# Patient Record
Sex: MALE | Race: WHITE
[De-identification: names, ages, dates, MRNs, and addresses within clinical notes are randomized per-mention and may not be internally consistent; named-entity substitution may affect disease eponyms.]

---

## 2022-06-30 ENCOUNTER — HOSPITAL ENCOUNTER (OUTPATIENT)
Dept: HOSPITAL 47 - RADCTMAIN | Age: 52
Discharge: HOME | End: 2022-06-30
Attending: FAMILY MEDICINE
Payer: COMMERCIAL

## 2022-06-30 DIAGNOSIS — R91.8: ICD-10-CM

## 2022-06-30 DIAGNOSIS — Z12.2: Primary | ICD-10-CM

## 2022-06-30 DIAGNOSIS — Z87.891: ICD-10-CM

## 2022-06-30 PROCEDURE — 71271 CT THORAX LUNG CANCER SCR C-: CPT

## 2022-07-01 NOTE — CTL
EXAMINATION TYPE:  CT Low Dose Lung

 

DATE OF EXAM ORDERED: 6/30/2022

 

HISTORY: Personal history of nicotine dependence. Lung cancer screening

 

CT DLP: 96 mGycm

CT CTDI: 2.30 mGy

Automated exposure control for dose reduction was used.

 

SCREENING VISIT: Baseline

 

COMPARISON: None available

 

TECHNIQUE: Low dose computed tomography scan was performed through the chest at 1 mm thick sections a
nd reconstructed images in multiple planes at 1 mm and 5 mm thick sections.

 

CT DIAGNOSTIC QUALITY: Satisfactory

 

FINDINGS:

LUNG NODULES: 

 

Right lung solid  5 mm nodule on CT image 227.  

 

Right lung base anterior solid  6 mm nodule on CT image 283.  

 

Right lung base 7 mm solid nodule in CT image 271.

 

Right lung solid elongated 10 mm parasagittal perifissural nodule in CT image 224.

 

Left lower lobe solid pleural-based 6 mm nodule in CT image 233.

 

Left lower lobe solid pleural-based 4 mm nodule in CT image 240.

 

LUNGS:

COPD: Severity: Moderate to markedly

Fibrosis: Severity: Minimal

Lymph nodes: No pathologically enlarged lymph nodes.

Other findings: None

 

RIGHT PLEURAL SPACE:

Effusion: None

Calcification: None

Thickening: None

Pneumothorax: None

 

LEFT PLEURAL SPACE:

Effusion: None

Calcification: None

Thickening: None

Pneumothorax: None

 

HEART:  

Heart Size: Normal

Coronary Calcification: Severe

Pericardial Effusion: None

 

OTHER FINDINGS:

Upper abdomen: 11 mm left hepatic lobe hypodensity, likely representing a hepatic cyst.

Bony thorax: No gross aggressive bone lesion.

Supraclavicular region: None

Other: None

 

IMPRESSION: Scattered pulmonary nodules measuring up to 7 mm as detailed above. COPD changes. Other i
ncidental findings as described above.

 

CT LUNG RAD AND CT CHEST RECOMMENDATION: Lung-Rad 3 Probably Benign: 6 month follow-up LDCT.

 

S Modifier (other clinically significant findings): As above.

## 2022-08-17 ENCOUNTER — HOSPITAL ENCOUNTER (OUTPATIENT)
Dept: HOSPITAL 47 - RADCTMAIN | Age: 52
Discharge: HOME | End: 2022-08-17
Attending: SURGERY
Payer: COMMERCIAL

## 2022-08-17 DIAGNOSIS — K81.1: Primary | ICD-10-CM

## 2022-08-17 DIAGNOSIS — Z87.19: ICD-10-CM

## 2022-08-17 PROCEDURE — 74160 CT ABDOMEN W/CONTRAST: CPT

## 2022-08-18 NOTE — CT
EXAMINATION TYPE: CT abdomen w con

 

DATE OF EXAM: 8/17/2022

 

COMPARISON: None.

 

HISTORY: Chronic Cholecystitis. Pt states he has Hx of gallstones

 

CT DLP: 337.4 mGycm

Automated exposure control for dose reduction was used.

 

TECHNIQUE:  Helical acquisition of images was performed from the lung bases through the top of iliac 
crest to include entire abdomen.

 

CONTRAST: 

Performed with Oral Contrast and with IV Contrast, patient injected with 70 ML mL of Isovue 300.

 

FINDINGS: 

 

LUNG BASES: No significant abnormality is appreciated.

 

LIVER/GB: Liver normal in size. There is subcentimeter low dense rounded lesion left hepatic lobe axi
al image 22 favored benign thin-walled cyst. Gallbladder contracted. No surrounding fluid or fat stra
nding. No abnormal biliary dilatation.

 

PANCREAS: No significant abnormality is seen.

 

SPLEEN: No significant abnormality is seen.

 

ADRENALS: No significant abnormality is seen.

 

KIDNEYS: Symmetric cortical medullary uptake and excretion without hydronephrosis seen bilaterally. T
here are 2 subcentimeter rounded low dense lesions in the left kidney axial image 22 and 32 thought t
o reflect benign thin-walled cysts.

 

BOWEL:  Oral contrast does not reach colonic level making evaluation of distal bowel slightly subopti
mal. Some small bowel loops left abdomen are not contrast opacified.

 

LYMPH NODES:  No significant abnormality is seen.

 

OSSEOUS STRUCTURES:  Mild facet arthropathy lower lumbar spine.

 

FREE AIR:  No free air is visualized.

 

OTHER: None

 

IMPRESSION: Contracted gallbladder. No CT dense intraluminal gallstones. No CT evidence for acute cho
lecystitis. No suspicious biliary dilatation.

## 2022-10-18 ENCOUNTER — HOSPITAL ENCOUNTER (OUTPATIENT)
Dept: HOSPITAL 47 - ORWHC2ENDO | Age: 52
Discharge: HOME | End: 2022-10-18
Attending: SURGERY
Payer: COMMERCIAL

## 2022-10-18 VITALS — TEMPERATURE: 97.6 F

## 2022-10-18 VITALS — HEART RATE: 78 BPM | RESPIRATION RATE: 16 BRPM

## 2022-10-18 VITALS — SYSTOLIC BLOOD PRESSURE: 135 MMHG | DIASTOLIC BLOOD PRESSURE: 78 MMHG

## 2022-10-18 DIAGNOSIS — Z87.39: ICD-10-CM

## 2022-10-18 DIAGNOSIS — Z12.11: Primary | ICD-10-CM

## 2022-10-18 DIAGNOSIS — Z88.5: ICD-10-CM

## 2022-10-18 DIAGNOSIS — K29.50: ICD-10-CM

## 2022-10-18 DIAGNOSIS — D12.5: ICD-10-CM

## 2022-10-18 DIAGNOSIS — D12.3: ICD-10-CM

## 2022-10-18 DIAGNOSIS — I10: ICD-10-CM

## 2022-10-18 DIAGNOSIS — K44.9: ICD-10-CM

## 2022-10-18 DIAGNOSIS — K31.9: ICD-10-CM

## 2022-10-18 DIAGNOSIS — F17.210: ICD-10-CM

## 2022-10-18 DIAGNOSIS — I25.10: ICD-10-CM

## 2022-10-18 DIAGNOSIS — Z79.899: ICD-10-CM

## 2022-10-18 DIAGNOSIS — T75.3XXA: ICD-10-CM

## 2022-10-18 DIAGNOSIS — K21.9: ICD-10-CM

## 2022-10-18 DIAGNOSIS — E78.5: ICD-10-CM

## 2022-10-18 DIAGNOSIS — Z83.3: ICD-10-CM

## 2022-10-18 DIAGNOSIS — I25.2: ICD-10-CM

## 2022-10-18 DIAGNOSIS — J44.9: ICD-10-CM

## 2022-10-18 DIAGNOSIS — Z82.49: ICD-10-CM

## 2022-10-18 PROCEDURE — 45385 COLONOSCOPY W/LESION REMOVAL: CPT

## 2022-10-18 PROCEDURE — 88305 TISSUE EXAM BY PATHOLOGIST: CPT

## 2022-10-18 PROCEDURE — 43239 EGD BIOPSY SINGLE/MULTIPLE: CPT

## 2022-10-18 PROCEDURE — 88342 IMHCHEM/IMCYTCHM 1ST ANTB: CPT

## 2022-10-18 NOTE — P.GSHP
History of Present Illness


H&P Date: 10/18/22


Chief Complaint: Nausea vomiting, screening





51-year-old male here for upper and lower endoscopy.  Patient was seen in the 

office in August.  Patient with complaints of nausea vomiting and some weight 

loss.  No abdominal pain.  Recent findings of gallstones.  He is due for co

lonoscopy as well.  No rectal bleeding or melena.  No change in bowel habits.





Past Medical History


Past Medical History: COPD, GERD/Reflux, Hyperlipidemia, Hypertension, 

Myocardial Infarction (MI), Musculoskeletal Disorder


Additional Past Medical History / Comment(s): DDD, quit taking BP meds on own, 

frequent nausea for about a year, occasional vomiting


Last Myocardial Infarction Date:: 2016


History of Any Multi-Drug Resistant Organisms: None Reported


Past Surgical History: Heart Catheterization With Stent


Additional Past Surgical History / Comment(s): multiple cleft palate & lip 

repair


Past Anesthesia/Blood Transfusion Reactions: Motion Sickness, Postoperative 

Nausea & Vomiting (PONV)


Date of Last Stent Placement:: 2016


Smoking Status: Current every day smoker





- Past Family History


  ** Father


Family Medical History: Diabetes Mellitus, Hypertension





  ** Mother


Family Medical History: Myocardial Infarction (MI)





Medications and Allergies


                                Home Medications











 Medication  Instructions  Recorded  Confirmed  Type


 


clonazePAM [KlonoPIN] 0.5 mg PO AS DIRECTED PRN 10/23/14 10/18/22 History


 


Nicotine 21Mg/24Hr Patch [Habitrol] 1 patch TRANSDERM DAILY #30 patch 10/25/14 

10/18/22 Rx


 


Nitroglycerin Sl Tabs [Nitrostat] 0.4 mg SUBLINGUAL Q5M PRN #25 tab 10/25/14 

10/18/22 Rx


 


Ondansetron [Zofran] 4 mg PO Q8HR PRN 10/14/22 10/18/22 History


 


Pantoprazole [Protonix] 40 mg PO DAILY 10/14/22 10/18/22 History








                                    Allergies











Allergy/AdvReac Type Severity Reaction Status Date / Time


 


codeine Allergy  Nausea & Verified 10/18/22 10:30





   Vomiting &  





   Diarrhea  














Surgical - Exam


                                   Vital Signs











Temp Pulse Resp BP Pulse Ox


 


 97.6 F   90   18   154/86   99 


 


 10/18/22 10:41  10/18/22 10:41  10/18/22 10:41  10/18/22 10:41  10/18/22 10:41

















Physical exam:


General: Well-developed, well-nourished


HEENT: Normocephalic, sclerae nonicteric


Abdomen: Nontender, nondistended


Extremities: No edema


Neuro: Alert and oriented





Assessment and Plan


(1) Colon cancer screening


Narrative/Plan: 


Will proceed with upper and lower endoscopy


Current Visit: Yes   Status: Acute   Code(s): Z12.11 - ENCOUNTER FOR SCREENING 

FOR MALIGNANT NEOPLASM OF COLON   SNOMED Code(s): 029055384

## 2022-10-18 NOTE — P.PCN
Date of Procedure: 10/18/22


Procedure(s) Performed: 


PREOPERATIVE DIAGNOSIS: Intractable nausea vomiting, weight loss, screening


POSTOPERATIVE DIAGNOSIS: Mild gastritis, small hiatal hernia, colon polyps


PROCEDURE: 1.  EGD with biopsy 2.  Colonoscopy with snare polypectomy


ANESTHESIA: MAC


SURGEON: Jorge A Field M.D.


SPECIMENS: Colon polyps


ENDOSCOPIC PROCEDURE: The patient was on the endoscopy table in the left 

decubitus position.  The Olympus gastroscope was inserted into the oropharynx 

and passed under direct visualization to the region of the third portion of the 

duodenum.  From that point the scope was slowly withdrawn inspecting all 

surfaces carefully.  There were no neoplastic inflammatory or polypoid lesions 

throughout the duodenum.  The pylorus was widely patent.  The stomach was 

carefully inspected.  There was mild gastritis present.  A biopsy of the antrum 

took place to rule out H. pylori.  Retroflexion revealed a small hiatal hernia. 

The esophagus was then carefully examined.  There were no neoplastic 

inflammatory or polypoid lesions throughout the visualized esophagus.


     The patient was kept on the endoscopy table in the left decubitus position.

 The Olympus colonoscope was inserted into the anus and passed under direct 

visualization to the base of the cecum.  The appendiceal orifice was visualized.

 From that point the scope was slowly withdrawn inspecting all surfaces 

carefully.  There were no neoplastic inflammatory or polypoid lesions throughout

the cecum or ascending colon.  At the hepatic flexure a small polyp was seen and

removed using the snare with cautery technique.  The transverse colon formed 

additional polyps were seen and removed in a similar fashion.  In the sigmoid 

colon for polyps were seen and removed in a similar fashion.  The rectum 

appeared normal.  No diverticulosis was seen.  Digital rectal examination was 

normal.  The patient was taken to the recovery room in stable condition per 

anesthesia guidelines.


RECOMMENDATIONS: Await biopsy results.  Repeat colonoscopy in 3-5 years.

## 2023-01-25 ENCOUNTER — HOSPITAL ENCOUNTER (OUTPATIENT)
Dept: HOSPITAL 47 - RADCTMAIN | Age: 53
Discharge: HOME | End: 2023-01-25
Attending: FAMILY MEDICINE
Payer: COMMERCIAL

## 2023-01-25 DIAGNOSIS — R91.8: ICD-10-CM

## 2023-01-25 DIAGNOSIS — I25.10: Primary | ICD-10-CM

## 2023-01-25 PROCEDURE — 71250 CT THORAX DX C-: CPT

## 2023-01-25 NOTE — CT
EXAMINATION TYPE: CT chest wo con

CT DLP: 225.6 mGycm, Automated exposure control for dose reduction was used.

 

DATE OF EXAM: 1/25/2023 4:42 PM

 

COMPARISON: CT 6/30/2022, abdomen 8/17/2022

 

CLINICAL INDICATION:Male, 52 years old with history of R91.8 abn finding lung field;, abdominal findi
ng on lung field found on prior scan.

 

TECHNIQUE: Multiple axial images were obtained through the chest. Sagittal and coronal reformats were
 created for review.

Contrast used: none.

Oral contrast used: none.

 

FINDINGS: 

LUNGS/ PLEURA: Mild centrilobular emphysema changes throughout the lungs. No focal consolidation, pne
umothorax or pleural effusion.

*  Stable right middle lobe 5 mm pulmonary nodules series 4 image 43,

*  Right middle lobe more medial pulmonary nodule is not definitively visualized which may be partial
ly due to some atelectasis at the cardiophrenic angle. 

*  The right lower lobe medial pulmonary nodule is stable to mildly decreased in size and has a flat 
appearance on sagittal imaging measuring 2 x 5 mm.

*  Stable left lower lobe 4 mm pulmonary nodule series 4 image 43. 

 

No new or enlarging pulmonary nodules.

 

AIRWAY: Patent and unremarkable.

HEART: Size within normal limits. Moderate to severe coronary artery calcifications.

MEDIASTINUM: No gross evidence of adenopathy.

VASCULATURE:  No aortic aneurysm. Mild atherosclerosis of the arterial vasculature.

MUSCULOSKELETAL: No acute osseous abnormalities

SOFT TISSUES/LYMPH NODES: Unremarkable.

LOWER NECK: No significant findings.

UPPER ABDOMEN: No significant findings. 

 

IMPRESSION:

1.  Stable pulmonary nodules no new or enlarging pulmonary nodules. Consider follow-up exam in one ye
ar to ensure stability.

2.  Moderate to severe coronary atherosclerosis.